# Patient Record
Sex: MALE | Race: OTHER
[De-identification: names, ages, dates, MRNs, and addresses within clinical notes are randomized per-mention and may not be internally consistent; named-entity substitution may affect disease eponyms.]

---
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
## 2019-08-05 ENCOUNTER — HOSPITAL ENCOUNTER (EMERGENCY)
Dept: HOSPITAL 72 - EMR | Age: 19
LOS: 1 days | Discharge: HOME | End: 2019-08-06
Payer: COMMERCIAL

## 2019-08-05 VITALS — BODY MASS INDEX: 27.55 KG/M2 | HEIGHT: 69 IN | WEIGHT: 186 LBS

## 2019-08-05 DIAGNOSIS — K21.9: Primary | ICD-10-CM

## 2019-08-05 DIAGNOSIS — R07.9: ICD-10-CM

## 2019-08-05 PROCEDURE — 99282 EMERGENCY DEPT VISIT SF MDM: CPT

## 2019-08-05 NOTE — EMERGENCY ROOM REPORT
History of Present Illness


General


Chief Complaint:  Pain


Source:  Patient





Present Illness


HPI


This is a 19-year-old male with no past medical history.  He presents with 

chief complaint of chest pain and abdominal pain.  He said is a burning 

sensation from his throat down to his epigastric area.  Worse when he eats or 

drink.  He felt like his esophagus closing up.  Also with bilateral chest pain.

  Onset last night.  Better now.  Denies any other complaint.  No exertional 

component.  No nausea no vomiting.  He had heartburn when he was a kid.


Allergies:  


Coded Allergies:  


     No Known Allergies (Unverified , 1/26/13)





Patient History


Past Medical History:  see triage record, old chart reviewed


Past Surgical History:  none


Pertinent Family History:  none


Social History:  Denies: smoking


Immunizations:  UTD


Reviewed Nursing Documentation:  PMH: Agreed; PSxH: Agreed





Nursing Documentation-PMH


Past Medical History:  No Stated History





Review of Systems


Eye:  Denies: eye pain, blurred vision


ENT:  Denies: ear pain, nose congestion, throat swelling


Respiratory:  Denies: cough, shortness of breath


Cardiovascular:  Reports: chest pain; Denies: palpitations


Gastrointestinal:  Reports: abdominal pain; Denies: diarrhea, nausea, vomiting


Musculoskeletal:  Denies: back pain, joint pain


Skin:  Denies: rash


Neurological:  Denies: headache, numbness


Endocrine:  Denies: increased thirst, increased urine


Hematologic/Lymphatic:  Denies: easy bruising


All Other Systems:  negative except mentioned in HPI





Physical Exam





Vital Signs








  Date Time  Temp Pulse Resp B/P (MAP) Pulse Ox O2 Delivery O2 Flow Rate FiO2


 


8/5/19 23:34 98.8 62 16 132/82 (99) 97 Room Air  





Vitals normal


Sp02 EP Interpretation:  reviewed, normal


General Appearance:  well appearing, no apparent distress, alert


Head:  normocephalic, atraumatic


Eyes:  bilateral eye PERRL, bilateral eye EOMI


ENT:  hearing grossly normal, normal pharynx


Neck:  full range of motion, supple, no meningismus


Respiratory:  chest non-tender, lungs clear, normal breath sounds


Cardiovascular #1:  regular rate, rhythm, no murmur


Gastrointestinal:  normal bowel sounds, non tender, no mass, no organomegaly, 

no bruit, non-distended


Musculoskeletal:  back normal, gait/station normal, normal range of motion


Psychiatric:  mood/affect normal





Medical Decision Making


Diagnostic Impression:  


 Primary Impression:  


 GERD (gastroesophageal reflux disease)


 Qualified Codes:  K21.9 - Gastro-esophageal reflux disease without esophagitis


ER Course


Patient presents with chief complaint consistent with reflux/gastritis.  No 

evidence of ACS, PE, dissection to name a few.  Will discharge home.





Last Vital Signs








  Date Time  Temp Pulse Resp B/P (MAP) Pulse Ox O2 Delivery O2 Flow Rate FiO2


 


8/5/19 23:34 98.8 62 16 132/82 (99) 97 Room Air  








Status:  improved


Disposition:  HOME, SELF-CARE


Condition:  Stable


Scripts


Omeprazole Magnesium (PRILOSEC OTC) 20 Mg Tablet.


20 MG ORAL DAILY, #30 TAB


   Prov: Daniel Lewis MD         8/5/19





Additional Instructions:  


Follow-up with your doctor in 7 days.  Return if symptoms worsen.











Daniel Lewis MD Aug 5, 2019 23:47

## 2019-08-06 VITALS — SYSTOLIC BLOOD PRESSURE: 132 MMHG | DIASTOLIC BLOOD PRESSURE: 82 MMHG

## 2019-08-06 NOTE — NUR
ED Nurse Note:



Recieved pt from home with c/o mid chest burning sensation since yesterday, pt 
describes as heart burn, rated at 7/10 and burning, no sob or labored breathing 
and denies any other complaints or discomforts, pt medicated as ordered and 
given prescription, pt is being d/c to home, sat with pt and discussed foods to 
avoid and s/s to monitor for, pt is leaving ambulatory, armband removed, nad 
noted during d/c to home.

## 2020-02-05 ENCOUNTER — HOSPITAL ENCOUNTER (EMERGENCY)
Dept: HOSPITAL 72 - EMR | Age: 20
Discharge: HOME | End: 2020-02-05
Payer: COMMERCIAL

## 2020-02-05 VITALS — SYSTOLIC BLOOD PRESSURE: 124 MMHG | DIASTOLIC BLOOD PRESSURE: 71 MMHG

## 2020-02-05 VITALS — WEIGHT: 180 LBS | HEIGHT: 69 IN | BODY MASS INDEX: 26.66 KG/M2

## 2020-02-05 VITALS — SYSTOLIC BLOOD PRESSURE: 125 MMHG | DIASTOLIC BLOOD PRESSURE: 74 MMHG

## 2020-02-05 DIAGNOSIS — J11.1: Primary | ICD-10-CM

## 2020-02-05 PROCEDURE — 99282 EMERGENCY DEPT VISIT SF MDM: CPT

## 2020-02-05 NOTE — NUR
ED Nurse Note:



pt ambulated to ed with steady gait, c/o flu like symptoms; runny nose, sore 
throat/cough, and fever x 4 days. NAD noted. pt vss.

## 2020-02-05 NOTE — EMERGENCY ROOM REPORT
History of Present Illness


General


Chief Complaint:  Flu Like Symptoms


Source:  Patient





Present Illness


HPI


20-year-old male with no symptom past medical history here complaining of 2 

days of generalized body ache, fever feeling feverish and chills.  Complains of 

cough and congestion however denies sore throat.  Denies abdominal pain, nausea 

vomiting at this time.  Has not taken medication for symptom relief.  Appears 

to be afebrile.  Denies recent travel, urinary symptoms, back to smoke, 

marijuana use, alcohol intake.


Allergies:  


Coded Allergies:  


     No Known Allergies (Unverified , 1/26/13)





Patient History


Past Medical History:  see triage record


Past Surgical History:  none


Pertinent Family History:  none


Immunizations:  UTD


Reviewed Nursing Documentation:  PMH: Agreed; PSxH: Agreed





Nursing Documentation-PMH


Past Medical History:  No Stated History





Review of Systems


All Other Systems:  negative except mentioned in HPI





Physical Exam





Vital Signs








  Date Time  Temp Pulse Resp B/P (MAP) Pulse Ox O2 Delivery O2 Flow Rate FiO2


 


2/5/20 18:39 99.5 105 19 125/74 (91) 98 Room Air  








Sp02 EP Interpretation:  reviewed, normal


General Appearance:  no apparent distress, alert, GCS 15, non-toxic


Head:  normocephalic, atraumatic


Eyes:  bilateral eye normal inspection, bilateral eye PERRL


ENT:  hearing grossly normal, normal pharynx, no angioedema, normal voice


Neck:  full range of motion, supple, no meningismus, supple/symm/no masses


Respiratory:  chest non-tender, lungs clear, normal breath sounds, no rhonchi, 

no retraction, no wheezing, speaking full sentences


Cardiovascular #1:  regular rate, rhythm, no edema, no murmur, normal capillary 

refill


Gastrointestinal:  non tender, soft


Rectal:  deferred


Genitourinary:  no CVA tenderness


Musculoskeletal:  back normal


Neurologic:  alert, motor strength/tone normal, oriented x3, sensory intact, 

responsive, speech normal


Psychiatric:  judgement/insight normal, memory normal, mood/affect normal, no 

suicidal/homicidal ideation


Skin:  no rash, palpation normal, normal inspection


Lymphatic:  no adenopathy





Medical Decision Making


PA Attestation


Diagnosis and treatment plans were reviewed and discussed with my supervising 

physician Dr. Ann


Diagnostic Impression:  


 Primary Impression:  


 Influenza


ER Course


20-year-old male with no symptom past medical history here complaining of 2 

days of generalized body ache, fever feeling feverish and chills.  Complains of 

cough and congestion however denies sore throat.  Denies abdominal pain, nausea 

vomiting at this time.  Has not taken medication for symptom relief.  Appears 

to be afebrile.  Denies recent travel, urinary symptoms, back to smoke, 

marijuana use, alcohol intake.





Ddx considered but are not limited to: strep pharyngitis, URI, tonsillitis, 

peritonsillar abscess, influenza














Vital signs: are WNL, pt. is afebrile








 H&PE are most consistent with: Influenza














ORDERS: Tamiflu, guaifenesin











ED INTERVENTIONS: None required at this time.























DISCHARGE: At this time pt. is stable for d/c to home. Will provide printed 

patient care instructions, and any necessary prescriptions. Care plan and 

follow up instructions have been discussed with the patient prior to discharge.

  Patient take medication as directed, follow-up primary care provider, patient 

has an upcoming finals tomorrow and does not want any time off from school.  If 

worsening symptoms return to the emergency room





Last Vital Signs








  Date Time  Temp Pulse Resp B/P (MAP) Pulse Ox O2 Delivery O2 Flow Rate FiO2


 


2/5/20 18:45 99.5 106 19 125/74 98 Room Air  








Disposition:  HOME, SELF-CARE


Condition:  Stable


Scripts


Guaifenesin* (GUAIFENESIN*) 100 Mg/5 Ml Liquid


5 ML ORAL Q6H, #120 ML 0 Refills


   Prov: Clovis Avalos         2/5/20 


Oseltamivir Phosphate (Tamiflu) 75 Mg Capsule


75 MG ORAL TWICE A DAY for 5 Days, #10 CAP


   Prov: Clovis Avalos         2/5/20


Patient Instructions:  Upper Respiratory Infection, Adult, Easy-to-Read





Additional Instructions:  


Take medication as directed, follow-up with your primary care provider, 

increase oral hydration, if worsening symptoms return to the emergency room











Clovis Avalos Feb 5, 2020 18:50